# Patient Record
Sex: FEMALE | Race: WHITE | Employment: FULL TIME | URBAN - METROPOLITAN AREA
[De-identification: names, ages, dates, MRNs, and addresses within clinical notes are randomized per-mention and may not be internally consistent; named-entity substitution may affect disease eponyms.]

---

## 2017-06-20 ENCOUNTER — TELEPHONE (OUTPATIENT)
Dept: SURGERY | Age: 47
End: 2017-06-20

## 2017-09-25 ENCOUNTER — TELEPHONE (OUTPATIENT)
Dept: SURGERY | Age: 47
End: 2017-09-25

## 2017-09-25 NOTE — TELEPHONE ENCOUNTER
Per MBSAQ requirements:  Letter sent requesting follow up appointment. Your health is our main concern. It is important for your health to have follow lab work and to see you surgeon at 3 months, 6 months and annually after your weight loss surgery. Your last office visit was on 12/04/2013. Additionally, the Department of Bariatric Surgery at our hospital is a member of the Bariatric National Surgical Quality Improvement Program (ACS NSQIP). As a participant in this program we gather information on the outcomes of our surgical weight loss patients after surgery. Please call the office for a follow up appointment at [phone]. If you have moved out of the area or have changed surgeons please call us and let us know the name of your doctor. Your health and feedback are important to us. We greatly appreciate your response.    Thank you,  Mayank Dodson [facility] [address] [facility]

## 2018-08-14 ENCOUNTER — DOCUMENTATION ONLY (OUTPATIENT)
Dept: SURGERY | Age: 48
End: 2018-08-14

## 2019-03-15 ENCOUNTER — DOCUMENTATION ONLY (OUTPATIENT)
Dept: SURGERY | Age: 49
End: 2019-03-15

## 2019-03-15 NOTE — LETTER
[facility] [place] [facility] 
 
 
Dear Patient, Your health is our main concern. It is important for your health to have follow-up lab work and to see you surgeon at 2 months, 4 months, 6 months, 9 months and annually after your weight loss surgery. Additionally, the Department of Bariatric Surgery at our hospital is a member of the [employer] [address] Surgical Quality Improvement Program ([facility] NSQIP). As a participant in this program, we gather information on the outcomes of our patients after surgery. Please call the office for a follow up appointment at [phone]. If you have moved out of the area or have changed surgeons please call us and let us know the name of your doctor. Your health and feedback are important to us. We greatly appreciate your response. Thank you, [facility] [place]

## 2019-04-02 ENCOUNTER — TELEPHONE (OUTPATIENT)
Dept: OTHER | Age: 49
End: 2019-04-02

## 2022-06-14 ENCOUNTER — HOSPITAL ENCOUNTER (OUTPATIENT)
Dept: LAB | Age: 52
Discharge: HOME | End: 2022-06-14
Payer: COMMERCIAL

## 2022-06-14 LAB
HCT VFR BLD AUTO: 40.5 % (ref 35–45)
HGB BLD-MCNC: 13.1 G/DL (ref 12–16)
POTASSIUM SERPL-SCNC: 4.5 MMOL/L (ref 3.5–5.5)

## 2022-06-14 PROCEDURE — 36415 COLL VENOUS BLD VENIPUNCTURE: CPT

## 2022-06-14 PROCEDURE — 84132 ASSAY OF SERUM POTASSIUM: CPT

## 2022-06-14 PROCEDURE — 85018 HEMOGLOBIN: CPT

## 2022-09-22 ENCOUNTER — HOSPITAL ENCOUNTER (OUTPATIENT)
Dept: LAB | Age: 52
Discharge: HOME | End: 2022-09-22
Payer: COMMERCIAL

## 2022-09-22 ENCOUNTER — TRANSCRIBE ORDER (OUTPATIENT)
Dept: REGISTRATION | Age: 52
End: 2022-09-22

## 2022-09-22 DIAGNOSIS — K60.2 ANAL FISSURE: Primary | ICD-10-CM

## 2022-09-22 DIAGNOSIS — K60.2 ANAL FISSURE: ICD-10-CM

## 2022-09-22 LAB
HCT VFR BLD AUTO: 37.4 % (ref 35–45)
HGB BLD-MCNC: 12.3 G/DL (ref 12–16)
POTASSIUM SERPL-SCNC: 4.2 MMOL/L (ref 3.5–5.5)

## 2022-09-22 PROCEDURE — 36415 COLL VENOUS BLD VENIPUNCTURE: CPT

## 2022-09-22 PROCEDURE — 85018 HEMOGLOBIN: CPT

## 2022-09-22 PROCEDURE — 84132 ASSAY OF SERUM POTASSIUM: CPT

## 2022-10-21 ENCOUNTER — HOSPITAL ENCOUNTER (OUTPATIENT)
Dept: LAB | Age: 52
Discharge: HOME | End: 2022-10-21
Payer: COMMERCIAL

## 2022-10-21 ENCOUNTER — TRANSCRIBE ORDER (OUTPATIENT)
Dept: REGISTRATION | Age: 52
End: 2022-10-21

## 2022-10-21 ENCOUNTER — HOSPITAL ENCOUNTER (OUTPATIENT)
Dept: NON INVASIVE DIAGNOSTICS | Age: 52
Discharge: HOME | End: 2022-10-21
Payer: COMMERCIAL

## 2022-10-21 DIAGNOSIS — Z01.818 OTHER SPECIFIED PRE-OPERATIVE EXAMINATION: ICD-10-CM

## 2022-10-21 DIAGNOSIS — K60.2 ANAL FISSURE: Primary | ICD-10-CM

## 2022-10-21 DIAGNOSIS — K60.2 ANAL FISSURE: ICD-10-CM

## 2022-10-21 DIAGNOSIS — I10 ESSENTIAL HYPERTENSION, MALIGNANT: ICD-10-CM

## 2022-10-21 LAB
ATRIAL RATE: 74 BPM
CALCULATED P AXIS, ECG09: 68 DEGREES
CALCULATED R AXIS, ECG10: 52 DEGREES
CALCULATED T AXIS, ECG11: 51 DEGREES
DIAGNOSIS, 93000: NORMAL
HCT VFR BLD AUTO: 36.5 % (ref 35–45)
HGB BLD-MCNC: 11.9 G/DL (ref 12–16)
P-R INTERVAL, ECG05: 186 MS
POTASSIUM SERPL-SCNC: 4.5 MMOL/L (ref 3.5–5.5)
Q-T INTERVAL, ECG07: 462 MS
QRS DURATION, ECG06: 138 MS
QTC CALCULATION (BEZET), ECG08: 512 MS
VENTRICULAR RATE, ECG03: 74 BPM

## 2022-10-21 PROCEDURE — 84132 ASSAY OF SERUM POTASSIUM: CPT

## 2022-10-21 PROCEDURE — 93005 ELECTROCARDIOGRAM TRACING: CPT

## 2022-10-21 PROCEDURE — 85018 HEMOGLOBIN: CPT

## 2022-10-21 PROCEDURE — 36415 COLL VENOUS BLD VENIPUNCTURE: CPT
